# Patient Record
Sex: FEMALE | Race: WHITE | NOT HISPANIC OR LATINO | URBAN - NONMETROPOLITAN AREA
[De-identification: names, ages, dates, MRNs, and addresses within clinical notes are randomized per-mention and may not be internally consistent; named-entity substitution may affect disease eponyms.]

---

## 2018-02-20 PROBLEM — H52.03: Noted: 2018-02-20

## 2019-01-31 ENCOUNTER — IMPORTED ENCOUNTER (OUTPATIENT)
Dept: URBAN - NONMETROPOLITAN AREA CLINIC 1 | Facility: CLINIC | Age: 44
End: 2019-01-31

## 2019-01-31 PROCEDURE — 92015 DETERMINE REFRACTIVE STATE: CPT

## 2019-01-31 PROCEDURE — 92310 CONTACT LENS FITTING OU: CPT

## 2019-01-31 PROCEDURE — 92014 COMPRE OPH EXAM EST PT 1/>: CPT

## 2019-01-31 NOTE — PATIENT DISCUSSION
Hyperopia-Discussed diagnosis with patient. Presbyopia-Discussed diagnosis with patient. Updated spec Rx given. Updated CL Rx given.

## 2020-02-10 ENCOUNTER — IMPORTED ENCOUNTER (OUTPATIENT)
Dept: URBAN - NONMETROPOLITAN AREA CLINIC 1 | Facility: CLINIC | Age: 45
End: 2020-02-10

## 2020-02-10 PROCEDURE — 92310 CONTACT LENS FITTING OU: CPT

## 2020-02-10 PROCEDURE — 92014 COMPRE OPH EXAM EST PT 1/>: CPT

## 2020-02-10 PROCEDURE — 92015 DETERMINE REFRACTIVE STATE: CPT

## 2020-02-10 NOTE — PATIENT DISCUSSION
Hyperopia-Discussed diagnosis with patient. Presbyopia-Discussed diagnosis with patient. Updated spec Rx given. Updated CL Rx given. CL wear-CLs fit and center well.-Stressed that patient should not sleep in CL. -Updated CL Rx given. -CL care and precautions given.

## 2021-05-25 ENCOUNTER — IMPORTED ENCOUNTER (OUTPATIENT)
Dept: URBAN - NONMETROPOLITAN AREA CLINIC 1 | Facility: CLINIC | Age: 46
End: 2021-05-25

## 2021-05-25 PROCEDURE — 92014 COMPRE OPH EXAM EST PT 1/>: CPT

## 2021-05-25 PROCEDURE — 92310 CONTACT LENS FITTING OU: CPT

## 2021-05-25 PROCEDURE — 92015 DETERMINE REFRACTIVE STATE: CPT

## 2021-09-07 ENCOUNTER — IMPORTED ENCOUNTER (OUTPATIENT)
Dept: URBAN - NONMETROPOLITAN AREA CLINIC 1 | Facility: CLINIC | Age: 46
End: 2021-09-07

## 2021-09-07 NOTE — PATIENT DISCUSSION
Hyperopia-Discussed diagnosis with patient. Presbyopia-Discussed diagnosis with patient. Updated spec Rx given. Updated CL Rx given. CL wear-CLs fit and center well.-Stressed that patient should not sleep in CL. -Updated CL Rx given. -CL care and precautions given. -Changed OS CTL to +3.75 due to very blurry vision

## 2022-04-09 ASSESSMENT — VISUAL ACUITY
OD_SC: 20/20
OD_SC: J1+
OU_OTHER: 20/25.
OS_SC: J1+
OD_SC: J2
OD_SC: 20/20
OD_CC: J2
OD_CC: J1+
OS_SC: 20/40
OS_CC: J2
OD_SC: J1+
OD_SC: 20/20
OU_SC: 20/20 BLURRY
OS_CC: J1+
OS_SC: J1+
OS_SC: J2

## 2022-04-09 ASSESSMENT — TONOMETRY
OD_IOP_MMHG: 12
OD_IOP_MMHG: 12
OD_IOP_MMHG: 14
OS_IOP_MMHG: 14
OS_IOP_MMHG: 12
OS_IOP_MMHG: 12

## 2022-06-07 ENCOUNTER — COMPREHENSIVE EXAM (OUTPATIENT)
Dept: RURAL CLINIC 2 | Facility: CLINIC | Age: 47
End: 2022-06-07

## 2022-06-07 DIAGNOSIS — H52.03: ICD-10-CM

## 2022-06-07 DIAGNOSIS — H52.4: ICD-10-CM

## 2022-06-07 PROCEDURE — 92310 CONTACT LENS FITTING OU: CPT

## 2022-06-07 PROCEDURE — 92014 COMPRE OPH EXAM EST PT 1/>: CPT

## 2022-06-07 PROCEDURE — 92015 DETERMINE REFRACTIVE STATE: CPT

## 2022-06-07 ASSESSMENT — TONOMETRY
OS_IOP_MMHG: 12
OD_IOP_MMHG: 12

## 2022-06-07 ASSESSMENT — VISUAL ACUITY
OS_CC: J1
OD_CC: 20/20

## 2023-06-15 ENCOUNTER — ESTABLISHED PATIENT (OUTPATIENT)
Dept: RURAL CLINIC 2 | Facility: CLINIC | Age: 48
End: 2023-06-15

## 2023-06-15 DIAGNOSIS — H52.223: ICD-10-CM

## 2023-06-15 DIAGNOSIS — H52.4: ICD-10-CM

## 2023-06-15 DIAGNOSIS — H52.03: ICD-10-CM

## 2023-06-15 PROCEDURE — 92310-E CONTACT LENS FITTING ESTABLISH PATIENT

## 2023-06-15 PROCEDURE — 92015 DETERMINE REFRACTIVE STATE: CPT

## 2023-06-15 PROCEDURE — 92014 COMPRE OPH EXAM EST PT 1/>: CPT

## 2023-06-15 ASSESSMENT — TONOMETRY
OD_IOP_MMHG: 13
OS_IOP_MMHG: 13

## 2023-06-15 ASSESSMENT — VISUAL ACUITY
OS_CC: 20/30
OD_CC: 20/20

## 2024-08-01 ENCOUNTER — COMPREHENSIVE EXAM (OUTPATIENT)
Dept: RURAL CLINIC 2 | Facility: CLINIC | Age: 49
End: 2024-08-01

## 2024-08-01 DIAGNOSIS — H52.03: ICD-10-CM

## 2024-08-01 DIAGNOSIS — H52.4: ICD-10-CM

## 2024-08-01 PROCEDURE — 92014 COMPRE OPH EXAM EST PT 1/>: CPT

## 2024-08-01 PROCEDURE — 92015 DETERMINE REFRACTIVE STATE: CPT

## 2024-08-01 PROCEDURE — 92310-E CONTACT LENS FITTING ESTABLISH PATIENT

## 2024-08-01 ASSESSMENT — TONOMETRY
OD_IOP_MMHG: 11
OS_IOP_MMHG: 13

## 2024-08-01 ASSESSMENT — VISUAL ACUITY
OS_CC: 20/20
OD_CC: 20/20

## 2025-08-11 ENCOUNTER — COMPREHENSIVE EXAM (OUTPATIENT)
Age: 50
End: 2025-08-11

## 2025-08-11 DIAGNOSIS — H52.223: ICD-10-CM

## 2025-08-11 DIAGNOSIS — Z79.899: ICD-10-CM

## 2025-08-11 DIAGNOSIS — H52.03: ICD-10-CM

## 2025-08-11 DIAGNOSIS — H52.4: ICD-10-CM

## 2025-08-11 PROCEDURE — 92015 DETERMINE REFRACTIVE STATE: CPT

## 2025-08-11 PROCEDURE — 92014 COMPRE OPH EXAM EST PT 1/>: CPT

## 2025-08-11 PROCEDURE — 92134 CPTRZ OPH DX IMG PST SGM RTA: CPT

## 2025-08-11 PROCEDURE — 92310-1 LEVEL 1 SOFT LENS UPDATE
